# Patient Record
Sex: MALE | Race: WHITE
[De-identification: names, ages, dates, MRNs, and addresses within clinical notes are randomized per-mention and may not be internally consistent; named-entity substitution may affect disease eponyms.]

---

## 2024-08-13 ENCOUNTER — APPOINTMENT (OUTPATIENT)
Dept: ORTHOPEDIC SURGERY | Facility: CLINIC | Age: 69
End: 2024-08-13

## 2024-08-19 ENCOUNTER — OFFICE (OUTPATIENT)
Dept: URBAN - METROPOLITAN AREA CLINIC 112 | Facility: CLINIC | Age: 69
Setting detail: OPHTHALMOLOGY
End: 2024-08-19
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
## 2024-08-19 PROCEDURE — NO SHOW FE NO SHOW FEE: Performed by: OPHTHALMOLOGY

## 2024-08-27 ENCOUNTER — APPOINTMENT (OUTPATIENT)
Dept: ORTHOPEDIC SURGERY | Facility: CLINIC | Age: 69
End: 2024-08-27
Payer: MEDICARE

## 2024-08-27 VITALS — BODY MASS INDEX: 32.35 KG/M2 | HEIGHT: 70 IN | WEIGHT: 226 LBS

## 2024-08-27 DIAGNOSIS — M25.512 PAIN IN LEFT SHOULDER: ICD-10-CM

## 2024-08-27 DIAGNOSIS — G89.29 LOW BACK PAIN, UNSPECIFIED: ICD-10-CM

## 2024-08-27 DIAGNOSIS — M54.50 LOW BACK PAIN, UNSPECIFIED: ICD-10-CM

## 2024-08-27 PROCEDURE — 73030 X-RAY EXAM OF SHOULDER: CPT | Mod: LT

## 2024-08-27 PROCEDURE — 72070 X-RAY EXAM THORAC SPINE 2VWS: CPT

## 2024-08-27 PROCEDURE — 72040 X-RAY EXAM NECK SPINE 2-3 VW: CPT

## 2024-08-27 PROCEDURE — 99204 OFFICE O/P NEW MOD 45 MIN: CPT

## 2024-08-27 NOTE — HISTORY OF PRESENT ILLNESS
[de-identified] : 69 year old male presents today with complaints of neck and mid back pain after trip and fall pulling the recliner chair onto back, on attempt of getting up he pulled kitchen chair down which landing on left leg.  Patient now experiencing neck and mid back pain since this incident 3 months ago. He also reports BUE pain that travels to elbow. Went to  for evaluation, received muscle relaxer to take prn. Patient reports LROM of Left shoulder and pain in B shoulders. Denies n/t. Patient is RHD, denies change in dexterity or fine motor skills  Patient with recent hospital admission for pain control and medical management. Patient discharged with Home PT arranged for neck and mid back.  PmHx: DM (A1c 9), CKD Stage 1, HTN, HLD NKDA Lives by self, Uses cane for ambulation  Date of Injury/Onset:  3 months Pain:    At Rest:   0/10 With Activity:   6-7/10 Mechanism of injury:  took a fall with recliner landing on back and another chair landing on top of him Quality of symptoms:  sharp pains into both arms from neck, pain wraps around and radiates Improves with:  meds Worse with:  reaching Prior treatment:  [facility] Prior Imaging:  no Additional Information: None

## 2024-08-27 NOTE — PHYSICAL EXAM
[] : ROM is limited secondary to pain [FreeTextEntry9] : decreased Er,.  unable to abduct past 90 degrees

## 2024-08-27 NOTE — ASSESSMENT
[FreeTextEntry1] : 69F M with neck and mid back pain as well a left shoulder pain.  likely rotator cuff tear MRI L shoulder Fu with sports Fu  6 weeks c/ w home PT has rx from hospital

## 2024-09-03 ENCOUNTER — RESULT REVIEW (OUTPATIENT)
Age: 69
End: 2024-09-03

## 2024-09-10 ENCOUNTER — APPOINTMENT (OUTPATIENT)
Dept: ORTHOPEDIC SURGERY | Facility: CLINIC | Age: 69
End: 2024-09-10
Payer: MEDICARE

## 2024-09-10 VITALS — BODY MASS INDEX: 32.35 KG/M2 | HEIGHT: 70 IN | WEIGHT: 226 LBS

## 2024-09-10 DIAGNOSIS — M25.512 PAIN IN LEFT SHOULDER: ICD-10-CM

## 2024-09-10 PROCEDURE — 99213 OFFICE O/P EST LOW 20 MIN: CPT

## 2024-09-10 NOTE — ASSESSMENT
[FreeTextEntry1] : 69F M with neck and mid back pain as well a left shoulder pain.  likely rotator cuff tear Fu sports for rotator cuff tear Fu 6 weeks for neck pain

## 2024-09-10 NOTE — HISTORY OF PRESENT ILLNESS
[de-identified] : 69 year old male presents today with complaints of neck and mid back pain after trip and fall pulling the recliner chair onto back, on attempt of getting up he pulled kitchen chair down which landing on left leg.  Patient now experiencing neck and mid back pain since this incident 3 months ago. He also reports BUE pain that travels to elbow. Went to  for evaluation, received muscle relaxer to take prn. Patient reports LROM of Left shoulder and pain in B shoulders. Denies n/t. Patient is RHD, denies change in dexterity or fine motor skills  Patient with recent hospital admission for pain control and medical management. Patient discharged with Home PT arranged for neck and mid back.  PmHx: DM (A1c 9), CKD Stage 1, HTN, HLD NKDA Lives by self, Uses cane for ambulation  Date of Injury/Onset:  3 months Pain:    At Rest:   0/10 With Activity:   6-7/10 Mechanism of injury:  took a fall with recliner landing on back and another chair landing on top of him Quality of symptoms:  sharp pains into both arms from neck, pain wraps around and radiates Improves with:  meds Worse with:  reaching Prior treatment:  [facility] Prior Imaging:  no Additional Information: None

## 2024-10-02 ENCOUNTER — APPOINTMENT (OUTPATIENT)
Dept: ORTHOPEDIC SURGERY | Facility: CLINIC | Age: 69
End: 2024-10-02

## 2024-11-12 ENCOUNTER — APPOINTMENT (OUTPATIENT)
Dept: ORTHOPEDIC SURGERY | Facility: CLINIC | Age: 69
End: 2024-11-12